# Patient Record
Sex: FEMALE | URBAN - METROPOLITAN AREA
[De-identification: names, ages, dates, MRNs, and addresses within clinical notes are randomized per-mention and may not be internally consistent; named-entity substitution may affect disease eponyms.]

---

## 2019-01-25 ENCOUNTER — TELEPHONE (OUTPATIENT)
Dept: OTHER | Facility: CLINIC | Age: 34
End: 2019-01-25

## 2019-01-25 NOTE — TELEPHONE ENCOUNTER
1/25/2019      Per male, there is nobody there by the name Shirin. Wrong number.             Outreach ,  Rohini Owusu